# Patient Record
Sex: FEMALE | Race: WHITE | NOT HISPANIC OR LATINO | Employment: FULL TIME | URBAN - METROPOLITAN AREA
[De-identification: names, ages, dates, MRNs, and addresses within clinical notes are randomized per-mention and may not be internally consistent; named-entity substitution may affect disease eponyms.]

---

## 2017-02-08 ENCOUNTER — APPOINTMENT (OUTPATIENT)
Dept: PREADMISSION TESTING | Facility: HOSPITAL | Age: 67
End: 2017-02-08

## 2017-02-08 ENCOUNTER — HOSPITAL ENCOUNTER (OUTPATIENT)
Dept: GENERAL RADIOLOGY | Facility: HOSPITAL | Age: 67
Discharge: HOME | End: 2017-02-08
Admitting: COLON & RECTAL SURGERY

## 2017-02-08 VITALS — BODY MASS INDEX: 24.8 KG/M2 | HEIGHT: 63 IN | WEIGHT: 139.99 LBS

## 2017-02-08 LAB
ANION GAP SERPL CALCULATED.3IONS-SCNC: 8 MMOL/L (ref 3–11)
APTT PPP: <24 SECONDS (ref 24–31)
BUN BLD-MCNC: 9 MG/DL (ref 9–23)
BUN/CREAT SERPL: 11.3 (ref 7–25)
CALCIUM SPEC-SCNC: 10.6 MG/DL (ref 8.7–10.4)
CHLORIDE SERPL-SCNC: 105 MMOL/L (ref 99–109)
CO2 SERPL-SCNC: 28 MMOL/L (ref 20–31)
CREAT BLD-MCNC: 0.8 MG/DL (ref 0.6–1.3)
DEPRECATED RDW RBC AUTO: 57.4 FL (ref 37–54)
ERYTHROCYTE [DISTWIDTH] IN BLOOD BY AUTOMATED COUNT: 19 % (ref 11.3–14.5)
GFR SERPL CREATININE-BSD FRML MDRD: 72 ML/MIN/1.73
GLUCOSE BLD-MCNC: 138 MG/DL (ref 70–100)
HBA1C MFR BLD: 6.4 % (ref 4.8–5.6)
HCT VFR BLD AUTO: 38.4 % (ref 34.5–44)
HGB BLD-MCNC: 12.1 G/DL (ref 11.5–15.5)
INR PPP: 1
MCH RBC QN AUTO: 25.7 PG (ref 27–31)
MCHC RBC AUTO-ENTMCNC: 31.5 G/DL (ref 32–36)
MCV RBC AUTO: 81.5 FL (ref 80–99)
PLATELET # BLD AUTO: 385 10*3/MM3 (ref 150–450)
PMV BLD AUTO: 9.6 FL (ref 6–12)
POTASSIUM BLD-SCNC: 4 MMOL/L (ref 3.5–5.5)
PROTHROMBIN TIME: 10.9 SECONDS (ref 9.6–11.5)
RBC # BLD AUTO: 4.71 10*6/MM3 (ref 3.89–5.14)
SODIUM BLD-SCNC: 141 MMOL/L (ref 132–146)
WBC NRBC COR # BLD: 9.27 10*3/MM3 (ref 3.5–10.8)

## 2017-02-08 PROCEDURE — 71020 HC CHEST PA AND LATERAL: CPT

## 2017-02-08 PROCEDURE — 36415 COLL VENOUS BLD VENIPUNCTURE: CPT

## 2017-02-08 PROCEDURE — 85730 THROMBOPLASTIN TIME PARTIAL: CPT | Performed by: COLON & RECTAL SURGERY

## 2017-02-08 PROCEDURE — 85610 PROTHROMBIN TIME: CPT | Performed by: COLON & RECTAL SURGERY

## 2017-02-08 PROCEDURE — 83036 HEMOGLOBIN GLYCOSYLATED A1C: CPT | Performed by: COLON & RECTAL SURGERY

## 2017-02-08 PROCEDURE — 80048 BASIC METABOLIC PNL TOTAL CA: CPT | Performed by: COLON & RECTAL SURGERY

## 2017-02-08 PROCEDURE — 85027 COMPLETE CBC AUTOMATED: CPT | Performed by: COLON & RECTAL SURGERY

## 2017-02-08 RX ORDER — LEVOCETIRIZINE DIHYDROCHLORIDE 5 MG/1
5 TABLET, FILM COATED ORAL EVERY EVENING
COMMUNITY

## 2017-02-08 RX ORDER — CELECOXIB 100 MG/1
100 CAPSULE ORAL DAILY
COMMUNITY

## 2017-02-08 NOTE — PAT
LUIS MEASUREMENTS   LENGTH 15IN  CALF 11.5IN     TYPE AND SCREEN TOO EARLY TO DRAW IN PAT, PINK TUBE DRAWN AND SENT TO LAB    MICKI IN SURGERY SCHEDULING ARRANGED HOTEL ROOM FOR PT ON 2-13-17 FOR NIGHT BEFORE SURGERY    ERAS INSTRUCTIONS AND EDUCATION GIVEN PT IN [facility]     LEROY SÁNCHEZ, GI NAVIGATOR NOTIFIED OF PT UPCOMING SURGERY ON 2-14-17

## 2017-02-14 ENCOUNTER — HOSPITAL ENCOUNTER (INPATIENT)
Facility: HOSPITAL | Age: 67
LOS: 2 days | Discharge: HOME | End: 2017-02-16
Attending: COLON & RECTAL SURGERY | Admitting: COLON & RECTAL SURGERY

## 2017-02-14 ENCOUNTER — ANESTHESIA (OUTPATIENT)
Dept: PERIOP | Facility: HOSPITAL | Age: 67
End: 2017-02-14

## 2017-02-14 ENCOUNTER — ANESTHESIA EVENT (OUTPATIENT)
Dept: PERIOP | Facility: HOSPITAL | Age: 67
End: 2017-02-14

## 2017-02-14 DIAGNOSIS — Z93.2 ILEOSTOMY, HAS CURRENTLY (HCC): ICD-10-CM

## 2017-02-14 PROBLEM — E78.5 HYPERLIPIDEMIA: Status: ACTIVE | Noted: 2017-02-14

## 2017-02-14 PROBLEM — R73.03 PREDIABETES: Status: ACTIVE | Noted: 2017-02-14

## 2017-02-14 LAB
ABO GROUP BLD: NORMAL
BLD GP AB SCN SERPL QL: NEGATIVE
GLUCOSE BLDC GLUCOMTR-MCNC: 116 MG/DL (ref 70–130)
GLUCOSE BLDC GLUCOMTR-MCNC: 151 MG/DL (ref 70–130)
GLUCOSE BLDC GLUCOMTR-MCNC: 165 MG/DL (ref 70–130)
GLUCOSE BLDC GLUCOMTR-MCNC: 92 MG/DL (ref 70–130)
HCT VFR BLD AUTO: 37.7 % (ref 34.5–44)
HGB BLD-MCNC: 11.9 G/DL (ref 11.5–15.5)
RH BLD: POSITIVE

## 2017-02-14 PROCEDURE — 25010000002 ERTAPENEM PER 500 MG: Performed by: COLON & RECTAL SURGERY

## 2017-02-14 PROCEDURE — 25010000002 DIAZEPAM PER 5 MG: Performed by: COLON & RECTAL SURGERY

## 2017-02-14 PROCEDURE — 82962 GLUCOSE BLOOD TEST: CPT

## 2017-02-14 PROCEDURE — 0DBB0ZZ EXCISION OF ILEUM, OPEN APPROACH: ICD-10-PCS | Performed by: COLON & RECTAL SURGERY

## 2017-02-14 PROCEDURE — 85014 HEMATOCRIT: CPT | Performed by: COLON & RECTAL SURGERY

## 2017-02-14 PROCEDURE — 85018 HEMOGLOBIN: CPT | Performed by: COLON & RECTAL SURGERY

## 2017-02-14 PROCEDURE — 25010000002 FENTANYL CITRATE (PF) 100 MCG/2ML SOLUTION: Performed by: NURSE ANESTHETIST, CERTIFIED REGISTERED

## 2017-02-14 PROCEDURE — 86901 BLOOD TYPING SEROLOGIC RH(D): CPT

## 2017-02-14 PROCEDURE — 88304 TISSUE EXAM BY PATHOLOGIST: CPT | Performed by: COLON & RECTAL SURGERY

## 2017-02-14 PROCEDURE — 86850 RBC ANTIBODY SCREEN: CPT

## 2017-02-14 PROCEDURE — 86900 BLOOD TYPING SEROLOGIC ABO: CPT

## 2017-02-14 PROCEDURE — 25010000002 NEOSTIGMINE 10 MG/10ML SOLUTION: Performed by: NURSE ANESTHETIST, CERTIFIED REGISTERED

## 2017-02-14 PROCEDURE — 25010000002 HYDROMORPHONE PER 4 MG: Performed by: COLON & RECTAL SURGERY

## 2017-02-14 PROCEDURE — 25010000002 HYDROMORPHONE PER 4 MG: Performed by: NURSE ANESTHETIST, CERTIFIED REGISTERED

## 2017-02-14 PROCEDURE — 25010000002 HEPARIN (PORCINE) PER 1000 UNITS: Performed by: COLON & RECTAL SURGERY

## 2017-02-14 PROCEDURE — 25010000002 PROPOFOL 10 MG/ML EMULSION: Performed by: NURSE ANESTHETIST, CERTIFIED REGISTERED

## 2017-02-14 PROCEDURE — 25010000002 DEXAMETHASONE PER 1 MG: Performed by: NURSE ANESTHETIST, CERTIFIED REGISTERED

## 2017-02-14 PROCEDURE — 94799 UNLISTED PULMONARY SVC/PX: CPT

## 2017-02-14 RX ORDER — CETIRIZINE HYDROCHLORIDE 10 MG/1
10 TABLET ORAL DAILY
Status: DISCONTINUED | OUTPATIENT
Start: 2017-02-14 | End: 2017-02-16 | Stop reason: HOSPADM

## 2017-02-14 RX ORDER — HYDRALAZINE HYDROCHLORIDE 20 MG/ML
10 INJECTION INTRAMUSCULAR; INTRAVENOUS EVERY 6 HOURS PRN
Status: DISCONTINUED | OUTPATIENT
Start: 2017-02-14 | End: 2017-02-16 | Stop reason: HOSPADM

## 2017-02-14 RX ORDER — ROCURONIUM BROMIDE 10 MG/ML
INJECTION, SOLUTION INTRAVENOUS AS NEEDED
Status: DISCONTINUED | OUTPATIENT
Start: 2017-02-14 | End: 2017-02-14 | Stop reason: SURG

## 2017-02-14 RX ORDER — ACETAMINOPHEN 10 MG/ML
1000 INJECTION, SOLUTION INTRAVENOUS EVERY 6 HOURS
Status: COMPLETED | OUTPATIENT
Start: 2017-02-14 | End: 2017-02-15

## 2017-02-14 RX ORDER — SODIUM CHLORIDE 0.9 % (FLUSH) 0.9 %
1-10 SYRINGE (ML) INJECTION AS NEEDED
Status: DISCONTINUED | OUTPATIENT
Start: 2017-02-14 | End: 2017-02-14 | Stop reason: HOSPADM

## 2017-02-14 RX ORDER — NALOXONE HCL 0.4 MG/ML
0.4 VIAL (ML) INJECTION
Status: DISCONTINUED | OUTPATIENT
Start: 2017-02-14 | End: 2017-02-15

## 2017-02-14 RX ORDER — HEPARIN SODIUM 5000 [USP'U]/ML
5000 INJECTION, SOLUTION INTRAVENOUS; SUBCUTANEOUS ONCE
Status: COMPLETED | OUTPATIENT
Start: 2017-02-14 | End: 2017-02-14

## 2017-02-14 RX ORDER — PROPOFOL 10 MG/ML
VIAL (ML) INTRAVENOUS AS NEEDED
Status: DISCONTINUED | OUTPATIENT
Start: 2017-02-14 | End: 2017-02-14 | Stop reason: SURG

## 2017-02-14 RX ORDER — NEOSTIGMINE METHYLSULFATE 1 MG/ML
INJECTION, SOLUTION INTRAVENOUS AS NEEDED
Status: DISCONTINUED | OUTPATIENT
Start: 2017-02-14 | End: 2017-02-14 | Stop reason: SURG

## 2017-02-14 RX ORDER — ONDANSETRON 2 MG/ML
4 INJECTION INTRAMUSCULAR; INTRAVENOUS EVERY 6 HOURS PRN
Status: DISCONTINUED | OUTPATIENT
Start: 2017-02-14 | End: 2017-02-16 | Stop reason: HOSPADM

## 2017-02-14 RX ORDER — SODIUM CHLORIDE, SODIUM LACTATE, POTASSIUM CHLORIDE, CALCIUM CHLORIDE 600; 310; 30; 20 MG/100ML; MG/100ML; MG/100ML; MG/100ML
9 INJECTION, SOLUTION INTRAVENOUS CONTINUOUS
Status: DISCONTINUED | OUTPATIENT
Start: 2017-02-14 | End: 2017-02-14 | Stop reason: HOSPADM

## 2017-02-14 RX ORDER — CELECOXIB 100 MG/1
100 CAPSULE ORAL DAILY
Status: DISCONTINUED | OUTPATIENT
Start: 2017-02-14 | End: 2017-02-16 | Stop reason: HOSPADM

## 2017-02-14 RX ORDER — MORPHINE SULFATE 2 MG/ML
1 INJECTION, SOLUTION INTRAMUSCULAR; INTRAVENOUS EVERY 4 HOURS PRN
Status: DISCONTINUED | OUTPATIENT
Start: 2017-02-14 | End: 2017-02-15

## 2017-02-14 RX ORDER — DIAZEPAM 5 MG/ML
2.5 INJECTION, SOLUTION INTRAMUSCULAR; INTRAVENOUS EVERY 6 HOURS PRN
Status: DISCONTINUED | OUTPATIENT
Start: 2017-02-14 | End: 2017-02-16 | Stop reason: HOSPADM

## 2017-02-14 RX ORDER — LIDOCAINE HYDROCHLORIDE 10 MG/ML
1 INJECTION, SOLUTION EPIDURAL; INFILTRATION; INTRACAUDAL; PERINEURAL ONCE
Status: COMPLETED | OUTPATIENT
Start: 2017-02-14 | End: 2017-02-14

## 2017-02-14 RX ORDER — ONDANSETRON 2 MG/ML
4 INJECTION INTRAMUSCULAR; INTRAVENOUS ONCE AS NEEDED
Status: DISCONTINUED | OUTPATIENT
Start: 2017-02-14 | End: 2017-02-14 | Stop reason: HOSPADM

## 2017-02-14 RX ORDER — LIDOCAINE HYDROCHLORIDE 20 MG/ML
INJECTION, SOLUTION INFILTRATION; PERINEURAL AS NEEDED
Status: DISCONTINUED | OUTPATIENT
Start: 2017-02-14 | End: 2017-02-14 | Stop reason: SURG

## 2017-02-14 RX ORDER — SODIUM CHLORIDE 9 MG/ML
INJECTION, SOLUTION INTRAVENOUS AS NEEDED
Status: DISCONTINUED | OUTPATIENT
Start: 2017-02-14 | End: 2017-02-14 | Stop reason: HOSPADM

## 2017-02-14 RX ORDER — DEXTROSE MONOHYDRATE, SODIUM CHLORIDE, SODIUM LACTATE, POTASSIUM CHLORIDE, CALCIUM CHLORIDE 5; 600; 310; 179; 20 G/100ML; MG/100ML; MG/100ML; MG/100ML; MG/100ML
125 INJECTION, SOLUTION INTRAVENOUS CONTINUOUS
Status: DISCONTINUED | OUTPATIENT
Start: 2017-02-14 | End: 2017-02-15

## 2017-02-14 RX ORDER — KETOROLAC TROMETHAMINE 30 MG/ML
15 INJECTION, SOLUTION INTRAMUSCULAR; INTRAVENOUS EVERY 6 HOURS PRN
Status: DISCONTINUED | OUTPATIENT
Start: 2017-02-14 | End: 2017-02-15

## 2017-02-14 RX ORDER — NICOTINE POLACRILEX 4 MG
15 LOZENGE BUCCAL
Status: DISCONTINUED | OUTPATIENT
Start: 2017-02-14 | End: 2017-02-16 | Stop reason: HOSPADM

## 2017-02-14 RX ORDER — FENTANYL CITRATE 50 UG/ML
INJECTION, SOLUTION INTRAMUSCULAR; INTRAVENOUS AS NEEDED
Status: DISCONTINUED | OUTPATIENT
Start: 2017-02-14 | End: 2017-02-14 | Stop reason: SURG

## 2017-02-14 RX ORDER — GLYCOPYRROLATE 0.2 MG/ML
INJECTION INTRAMUSCULAR; INTRAVENOUS AS NEEDED
Status: DISCONTINUED | OUTPATIENT
Start: 2017-02-14 | End: 2017-02-14 | Stop reason: SURG

## 2017-02-14 RX ORDER — HEPARIN SODIUM 5000 [USP'U]/ML
5000 INJECTION, SOLUTION INTRAVENOUS; SUBCUTANEOUS EVERY 8 HOURS SCHEDULED
Status: DISCONTINUED | OUTPATIENT
Start: 2017-02-15 | End: 2017-02-16 | Stop reason: HOSPADM

## 2017-02-14 RX ORDER — FENTANYL CITRATE 50 UG/ML
50 INJECTION, SOLUTION INTRAMUSCULAR; INTRAVENOUS
Status: DISCONTINUED | OUTPATIENT
Start: 2017-02-14 | End: 2017-02-14 | Stop reason: HOSPADM

## 2017-02-14 RX ORDER — PANTOPRAZOLE SODIUM 40 MG/1
40 TABLET, DELAYED RELEASE ORAL
Status: DISCONTINUED | OUTPATIENT
Start: 2017-02-14 | End: 2017-02-16 | Stop reason: HOSPADM

## 2017-02-14 RX ORDER — FAMOTIDINE 20 MG/1
20 TABLET, FILM COATED ORAL 2 TIMES DAILY
Status: DISCONTINUED | OUTPATIENT
Start: 2017-02-14 | End: 2017-02-16 | Stop reason: HOSPADM

## 2017-02-14 RX ORDER — HYDROMORPHONE HYDROCHLORIDE 1 MG/ML
0.5 INJECTION, SOLUTION INTRAMUSCULAR; INTRAVENOUS; SUBCUTANEOUS
Status: DISCONTINUED | OUTPATIENT
Start: 2017-02-14 | End: 2017-02-14 | Stop reason: HOSPADM

## 2017-02-14 RX ORDER — FAMOTIDINE 10 MG/ML
20 INJECTION, SOLUTION INTRAVENOUS ONCE
Status: DISCONTINUED | OUTPATIENT
Start: 2017-02-14 | End: 2017-02-14

## 2017-02-14 RX ORDER — DEXAMETHASONE SODIUM PHOSPHATE 10 MG/ML
INJECTION INTRAMUSCULAR; INTRAVENOUS AS NEEDED
Status: DISCONTINUED | OUTPATIENT
Start: 2017-02-14 | End: 2017-02-14 | Stop reason: SURG

## 2017-02-14 RX ORDER — DEXTROSE MONOHYDRATE 25 G/50ML
25 INJECTION, SOLUTION INTRAVENOUS
Status: DISCONTINUED | OUTPATIENT
Start: 2017-02-14 | End: 2017-02-16 | Stop reason: HOSPADM

## 2017-02-14 RX ORDER — PREGABALIN 75 MG/1
75 CAPSULE ORAL ONCE
Status: COMPLETED | OUTPATIENT
Start: 2017-02-14 | End: 2017-02-14

## 2017-02-14 RX ORDER — ALVIMOPAN 12 MG/1
12 CAPSULE ORAL 2 TIMES DAILY
Status: DISCONTINUED | OUTPATIENT
Start: 2017-02-15 | End: 2017-02-16 | Stop reason: HOSPADM

## 2017-02-14 RX ORDER — MAGNESIUM HYDROXIDE 1200 MG/15ML
LIQUID ORAL AS NEEDED
Status: DISCONTINUED | OUTPATIENT
Start: 2017-02-14 | End: 2017-02-14 | Stop reason: HOSPADM

## 2017-02-14 RX ORDER — MONTELUKAST SODIUM 10 MG/1
10 TABLET ORAL NIGHTLY
Status: DISCONTINUED | OUTPATIENT
Start: 2017-02-14 | End: 2017-02-16 | Stop reason: HOSPADM

## 2017-02-14 RX ORDER — ACETAMINOPHEN 10 MG/ML
1000 INJECTION, SOLUTION INTRAVENOUS ONCE
Status: COMPLETED | OUTPATIENT
Start: 2017-02-14 | End: 2017-02-14

## 2017-02-14 RX ORDER — SCOLOPAMINE TRANSDERMAL SYSTEM 1 MG/1
1 PATCH, EXTENDED RELEASE TRANSDERMAL ONCE
Status: DISCONTINUED | OUTPATIENT
Start: 2017-02-14 | End: 2017-02-14

## 2017-02-14 RX ORDER — FAMOTIDINE 20 MG/1
20 TABLET, FILM COATED ORAL ONCE
Status: COMPLETED | OUTPATIENT
Start: 2017-02-14 | End: 2017-02-14

## 2017-02-14 RX ORDER — ATORVASTATIN CALCIUM 10 MG/1
10 TABLET, FILM COATED ORAL NIGHTLY
Status: DISCONTINUED | OUTPATIENT
Start: 2017-02-14 | End: 2017-02-16 | Stop reason: HOSPADM

## 2017-02-14 RX ADMIN — FENTANYL CITRATE 50 MCG: 50 INJECTION, SOLUTION INTRAMUSCULAR; INTRAVENOUS at 10:36

## 2017-02-14 RX ADMIN — LIDOCAINE HYDROCHLORIDE 30 MG: 20 INJECTION, SOLUTION INFILTRATION; PERINEURAL at 10:09

## 2017-02-14 RX ADMIN — PANTOPRAZOLE SODIUM 40 MG: 40 TABLET, DELAYED RELEASE ORAL at 17:03

## 2017-02-14 RX ADMIN — HYDROMORPHONE HYDROCHLORIDE 0.5 MG: 1 INJECTION, SOLUTION INTRAMUSCULAR; INTRAVENOUS; SUBCUTANEOUS at 15:30

## 2017-02-14 RX ADMIN — SODIUM CHLORIDE, POTASSIUM CHLORIDE, SODIUM LACTATE AND CALCIUM CHLORIDE 9 ML/HR: 600; 310; 30; 20 INJECTION, SOLUTION INTRAVENOUS at 07:51

## 2017-02-14 RX ADMIN — PROPOFOL 10 MG: 10 INJECTION, EMULSION INTRAVENOUS at 10:25

## 2017-02-14 RX ADMIN — PROPOFOL 10 MG: 10 INJECTION, EMULSION INTRAVENOUS at 10:40

## 2017-02-14 RX ADMIN — DULOXETINE 60 MG: 60 CAPSULE, DELAYED RELEASE ORAL at 13:44

## 2017-02-14 RX ADMIN — HYDROMORPHONE HYDROCHLORIDE 0.5 MG: 1 INJECTION, SOLUTION INTRAMUSCULAR; INTRAVENOUS; SUBCUTANEOUS at 11:37

## 2017-02-14 RX ADMIN — SCOPALAMINE 1 PATCH: 1 PATCH, EXTENDED RELEASE TRANSDERMAL at 07:51

## 2017-02-14 RX ADMIN — POTASSIUM CHLORIDE, SODIUM CHLORIDE, CALCIUM CHLORIDE, SODIUM LACTATE, AND DEXTROSE MONOHYDRATE 125 ML/HR: 1.79; 6; .2; 3.1; 5 INJECTION, SOLUTION INTRAVENOUS at 13:45

## 2017-02-14 RX ADMIN — ROCURONIUM BROMIDE 30 MG: 10 INJECTION INTRAVENOUS at 10:10

## 2017-02-14 RX ADMIN — PREGABALIN 75 MG: 75 CAPSULE ORAL at 07:50

## 2017-02-14 RX ADMIN — PROPOFOL 10 MG: 10 INJECTION, EMULSION INTRAVENOUS at 10:30

## 2017-02-14 RX ADMIN — ATORVASTATIN CALCIUM 10 MG: 10 TABLET, FILM COATED ORAL at 21:13

## 2017-02-14 RX ADMIN — NEOSTIGMINE METHYLSULFATE 2 MG: 1 INJECTION, SOLUTION INTRAVENOUS at 10:52

## 2017-02-14 RX ADMIN — HYDROMORPHONE HYDROCHLORIDE 0.5 MG: 1 INJECTION, SOLUTION INTRAMUSCULAR; INTRAVENOUS; SUBCUTANEOUS at 19:29

## 2017-02-14 RX ADMIN — PROPOFOL 10 MG: 10 INJECTION, EMULSION INTRAVENOUS at 10:35

## 2017-02-14 RX ADMIN — FAMOTIDINE 20 MG: 20 TABLET ORAL at 17:03

## 2017-02-14 RX ADMIN — HEPARIN SODIUM 5000 UNITS: 5000 INJECTION, SOLUTION INTRAVENOUS; SUBCUTANEOUS at 07:51

## 2017-02-14 RX ADMIN — POTASSIUM CHLORIDE, SODIUM CHLORIDE, CALCIUM CHLORIDE, SODIUM LACTATE, AND DEXTROSE MONOHYDRATE 125 ML/HR: 1.79; 6; .2; 3.1; 5 INJECTION, SOLUTION INTRAVENOUS at 21:13

## 2017-02-14 RX ADMIN — DIAZEPAM 2.5 MG: 5 INJECTION, SOLUTION INTRAMUSCULAR; INTRAVENOUS at 11:57

## 2017-02-14 RX ADMIN — ACETAMINOPHEN 1000 MG: 10 INJECTION, SOLUTION INTRAVENOUS at 17:03

## 2017-02-14 RX ADMIN — ROBINUL 0.4 MG: 0.2 INJECTION INTRAMUSCULAR; INTRAVENOUS at 10:52

## 2017-02-14 RX ADMIN — FENTANYL CITRATE 50 MCG: 50 INJECTION, SOLUTION INTRAMUSCULAR; INTRAVENOUS at 10:30

## 2017-02-14 RX ADMIN — HYDROMORPHONE HYDROCHLORIDE 0.5 MG: 1 INJECTION, SOLUTION INTRAMUSCULAR; INTRAVENOUS; SUBCUTANEOUS at 11:42

## 2017-02-14 RX ADMIN — ACETAMINOPHEN 1000 MG: 10 INJECTION, SOLUTION INTRAVENOUS at 08:19

## 2017-02-14 RX ADMIN — DEXAMETHASONE SODIUM PHOSPHATE 4 MG: 10 INJECTION INTRAMUSCULAR; INTRAVENOUS at 10:22

## 2017-02-14 RX ADMIN — FAMOTIDINE 20 MG: 20 TABLET ORAL at 07:50

## 2017-02-14 RX ADMIN — PROPOFOL 10 MG: 10 INJECTION, EMULSION INTRAVENOUS at 10:45

## 2017-02-14 RX ADMIN — CETIRIZINE HYDROCHLORIDE 10 MG: 10 TABLET, FILM COATED ORAL at 13:44

## 2017-02-14 RX ADMIN — FENTANYL CITRATE 50 MCG: 50 INJECTION, SOLUTION INTRAMUSCULAR; INTRAVENOUS at 11:48

## 2017-02-14 RX ADMIN — SODIUM CHLORIDE 1 G: 9 INJECTION, SOLUTION INTRAVENOUS at 10:07

## 2017-02-14 RX ADMIN — PROPOFOL 150 MG: 10 INJECTION, EMULSION INTRAVENOUS at 10:10

## 2017-02-14 RX ADMIN — LIDOCAINE HYDROCHLORIDE 1 ML: 10 INJECTION, SOLUTION EPIDURAL; INFILTRATION; INTRACAUDAL; PERINEURAL at 07:51

## 2017-02-14 RX ADMIN — MONTELUKAST SODIUM 10 MG: 10 TABLET, FILM COATED ORAL at 21:13

## 2017-02-14 NOTE — BRIEF OP NOTE
ILEOSTOMY TAKEDOWN  Procedure Note    Rubia Ricardo  2/14/2017    Pre-op Diagnosis:   Ileostomy in place    Post-op Diagnosis:     Same  Procedure/CPT® Codes:      Procedure(s):  ILEOSTOMY TAKEDOWN    Surgeon(s):  Lexie Lua MD    Anesthesia: General with Block    Staff:   Circulator: Lexie Hernandez RN; Love Beaver RN  Scrub Person: Chon Lewis  Nursing Assistant: Holly Ram  Assistant: FAVIOLA Varma  Other: Shannon Cornell RN    Estimated Blood Loss: * No values recorded between 2/14/2017 10:07 AM and 2/14/2017 10:54 AM *    Specimens:                  ID Type Source Tests Collected by Time Destination   A : ILEOSTOMY TRIM Tissue Small Intestine, Ileum TISSUE EXAM Lexie Lua MD 2/14/2017 1042          Drains:   Ileostomy 10/25/16 0136 loop ileostomy (Active)           Findings: Minimal adhesions    Complications: None      Lexie Lua MD     Date: 2/14/2017  Time: 10:59 AM

## 2017-02-14 NOTE — PLAN OF CARE
Problem: Patient Care Overview (Adult)  Goal: Plan of Care Review  Outcome: Ongoing (interventions implemented as appropriate)    02/14/17 1636   Coping/Psychosocial Response Interventions   Plan Of Care Reviewed With patient   Patient Care Overview   Progress no change       Goal: Adult Individualization and Mutuality  Outcome: Ongoing (interventions implemented as appropriate)

## 2017-02-14 NOTE — H&P
Admission      Patient Name: Rubia Ricardo  MRN: [id number]  : 1950  DOS: 2017    Attending: Toy Olivas MD    Primary Care Provider: Skyler Ley DO      Patient Care Team:  Skyler Ley DO as PCP - General (Family Medicine)    Chief complaint:  Ileostomy takedown    Subjective   Patient is a 66 y.o. female presented for ileostomy takedown by Dr. Lua under GA. She tolerated surgery well and is admitted for further medical management. She had a colonoscopy in Oct 2016, had a perforated bowel and required emergency surgery for ostomy placement. Subsequently, she was readmitted for rule out sepsis and vomiting feces. She was followed by Infectious Disease for peritonitis. She spent several days at [facility] and then at a local rehab close to her home.    When seen post op she is doing well. Her pain is well controlled. She denies nausea, shortness of breath or chest pain. No hx of DVT or PE.    Seen later in her room, doing very well, no f/c/n/vom/sob. Ambulated to  already. wy  Records from her admit in 2016  reviewed.wy  Allergies:  Allergies   Allergen Reactions   • Sulfa Antibiotics Shortness Of Breath   • Erythromycin Rash       Meds:  Prescriptions Prior to Admission   Medication Sig Dispense Refill Last Dose   • Atorvastatin Calcium (LIPITOR PO) Take 1 tablet by mouth Daily.   2017 at 0800   • BIOTIN PO Take 1 tablet by mouth Daily.   2017 at 0800   • celecoxib (CeleBREX) 100 MG capsule Take 100 mg by mouth Daily.   2017 at 0800   • Cyanocobalamin (VITAMIN B12 PO) Take 1 tablet by mouth Daily.   2017 at 0800   • DULoxetine (CYMBALTA) 30 MG capsule Take 3 capsules by mouth Daily.   2017 at 0800   • famotidine (PEPCID) 20 MG tablet Take 1 tablet by mouth 2 (Two) Times a Day. (Patient taking differently: Take 20 mg by mouth 2 (Two) Times a Day As Needed.)   2017 at 0800   • Multiple Vitamins-Minerals (MULTIVITAMIN ADULT PO) Take 1 tablet  "by mouth Daily.   2/13/2017 at 0800   • ondansetron (ZOFRAN) 4 MG tablet Take 1 tablet by mouth Every 8 (Eight) Hours As Needed for nausea or vomiting. 30 tablet 1 Past Month   • pantoprazole (PROTONIX) 40 MG EC tablet Take 1 tablet by mouth 2 (Two) Times a Day Before Meals.   2/13/2017 at 0800   • levocetirizine (XYZAL) 5 MG tablet Take 5 mg by mouth Every Evening.   2/12/2017   • montelukast (SINGULAIR) 10 MG tablet Take 10 mg by mouth Every Night.   2/12/2017       History:   Past Medical History   Diagnosis Date   • Anxiety    • Arthritis    • Colitis    • Depression    • GERD (gastroesophageal reflux disease)    • Hyperlipidemia    • Hypertension      D/T PAIN AND STRESS, SINCE RESOLVED PER PT REPORT    • Wears glasses      Past Surgical History   Procedure Laterality Date   • Tubal abdominal ligation     • Cholecystectomy     • Colon resection N/A 10/25/2016     Procedure: COLON RESECTION LOW ANTERIOR, WITH DIVERTING ILEOSTOMY.;  Surgeon: Lexie Lua MD;  [facility];  Service:    • Colonoscopy  10/24/2016     BOWEL WAS KNICKED CAUSING PT TO HAVE A BOWEL RESECTION AND ILEOSTOMY      Family History   Problem Relation Age of Onset   • Parkinsonism Mother    • Stroke Father      Social History   Substance Use Topics   • Smoking status: Never Smoker   • Smokeless tobacco: Never Used   • Alcohol use No   Lives alone. 2 children. Work as  at [employer].    Review of Systems  Pertinent items are noted in HPI, all other systems reviewed and negative  Except some hair loss recently.wy    Vital Signs  Visit Vitals   • /74 (BP Location: Right arm)   • Pulse 78   • Temp 97.7 °F (36.5 °C) (Temporal Artery )   • Resp 16   • Ht 63\" (160 cm)   • Wt 139 lb 15.9 oz (63.5 kg)   • SpO2 94%   • BMI 24.8 kg/m2       Physical Exam:    General Appearance:    Alert, cooperative, in no acute distress   Head:    Normocephalic, without obvious abnormality, atraumatic   Eyes:            Lids and lashes " normal, conjunctivae and sclerae normal, no   icterus, no pallor, corneas clear, PERRLA   Ears:    Ears appear intact with no abnormalities noted   Throat:   No oral lesions, no thrush, oral mucosa moist   Neck:   No adenopathy, supple, trachea midline, no thyromegaly, no     carotid bruit, no JVD   Lungs:     Clear to auscultation,respirations regular, even and                   unlabored    Heart:    Regular rhythm and normal rate, normal S1 and S2, no            murmur, no gallop, no rub, no click   Abdomen:     Covaderm with shadow drainage. abd soft with expected tenderness.   Genitalia:    Deferred   Extremities:   Moves all extremities well, no edema, no cyanosis, no              redness   Pulses:   Pulses palpable and equal bilaterally   Skin:   No bleeding, bruising or rash   Neurologic:   Cranial nerves 2 - 12 grossly intact, sensation intact       Results from last 7 days  Lab Units 02/14/17  1257 02/08/17  1357   WBC 10*3/mm3  --  9.27   HEMOGLOBIN g/dL 11.9 12.1   HEMATOCRIT % 37.7 38.4   PLATELETS 10*3/mm3  --  385       Results from last 7 days  Lab Units 02/08/17  1357   SODIUM mmol/L 141   POTASSIUM mmol/L 4.0   CHLORIDE mmol/L 105   TOTAL CO2 mmol/L 28.0   BUN mg/dL 9   CREATININE mg/dL 0.80   CALCIUM mg/dL 10.6*   GLUCOSE mg/dL 138*     Lab Results   Component Value Date    HGBA1C 6.40 (H) 02/08/2017       Assessment and Plan:   Principal Problem:    S/P ileostomy takedown  Active Problems:    HTN (hypertension)    Anxiety and depression    GERD (gastroesophageal reflux disease)    Ileostomy in place    Hyperlipidemia    Prediabetes      Plan  1. Ambulation  2. Pain control-prns   3. IS-encourage  4. DVT proph- mechs/heparin   5. Bowel regimen- entereg  6. Resume home medications as appropriate  7. Monitor post-op labs, BG( Discussed with patient elevated A1C c/w prediabetes)  8. DC planning   9. Diet, advance as tolerated./IVF, watch UOP.  SSI PRN      FAVIOLA Henson  02/14/17  2:04  PM  Seen and examined by me. Agree with above. Discussed with patient.

## 2017-02-14 NOTE — ANESTHESIA PROCEDURE NOTES
Airway  Urgency: elective    Airway not difficult    General Information and Staff    Patient location during procedure: OR    Indications and Patient Condition  Indications for airway management: airway protection    Preoxygenated: yes  Mask difficulty assessment: 1 - vent by mask    Final Airway Details  Final airway type: endotracheal airway      Successful airway: ETT  Cuffed: yes   Successful intubation technique: direct laryngoscopy  Facilitating devices/methods: intubating stylet  Endotracheal tube insertion site: oral  Blade: Benitez  Blade size: #2  ETT size: 7.0 mm  Cormack-Lehane Classification: grade I - full view of glottis  Placement verified by: chest auscultation and capnometry   Measured from: lips  ETT to lips (cm): 20  Number of attempts at approach: 1

## 2017-02-14 NOTE — PLAN OF CARE
Problem: Perioperative Period (Adult)  Goal: Signs and Symptoms of Listed Potential Problems Will be Absent or Manageable (Perioperative Period)  Outcome: Ongoing (interventions implemented as appropriate)    02/14/17 3524   Perioperative Period   Problems Assessed (Perioperative Period) all   Problems Present (Perioperative Period) pain

## 2017-02-14 NOTE — OP NOTE
DATE OF PROCEDURE: 02/14/2017    SURGEON:  Lexie Lua MD    ASSISTANT:  FAVIOLA Cedillo     PRIMARY CARE PHYSICIAN:  Skyler Ley DO    PREOPERATIVE DIAGNOSIS: Ileostomy in place.     POSTOPERATIVE DIAGNOSIS:  Ileostomy in place.      PROCEDURE PERFORMED: Ileostomy takedown.     COMPLICATIONS: None.     ESTIMATED BLOOD LOSS: 20 mL     ANESTHESIA: General endotracheal anesthesia and 266 mg of Exparel local anesthetic solution.     INDICATION FOR PROCEDURE:  The patient is a 66-year-old female who has a history of low anterior resection with diverting loop ileostomy for a perforated colon a colonoscopy.  Recent proctoscopic exam and Gastrografin enema did not demonstrate any evidence of leak or complications from her anastomosis, as she has healed her incisions, and is overall physically fit for her operation. After risks, benefits, and alternatives were discussed, she decided to proceed with operative management.     DESCRIPTION OF PROCEDURE: The patient was brought to the operating room and placed in the supine position. After successful induction of general endotracheal anesthesia, the operative site was prepped and draped in the usual sterile fashion. The mucocutaneous junction was incised using a #15 blade scalpel. The bowel was dissected using a combination of sharp dissection and the electrocautery from down to the level of the fascia. Once it was all free up, mesentery was clamped and tied.  A side-to-side anastomosis was performed using a 75 blue ALEJA stapler. The anastomosis was inspected. There was no webbing or bleeding. The common enterotomy was closed using a single fire of ALEJA 75 blue stapler. The specimen was passed off the field. The apical and apical stitch was placed of 3-0 Vicryl. The anastomosis was placed back into the abdomen. The fascia was checked to be free of adhesions, which it was.  The fascia was then closed with #1 non-looped PDS figure-of-eight stitches. The wound was  irrigated and dried. The skin was closed with a loose pursestring of Monocryl. A dilute Betadine 4 x 4 was placed in the wound and Covaderm was applied.      All counts were announced as correct at the end the case.     The patient tolerated the procedure well, was extubated in the operating room, and transferred to recovery in stable condition.       Lexie Lua MD JR/srikanth  DD: 02/14/2017 11:01:47  DT: 02/14/2017 14:15:10  Voice Rec. ID #[id number]  Voice Original ID #[id number]  Doc ID #[id number]  Rev. #0  cc:    Skyler Ley DO

## 2017-02-14 NOTE — ANESTHESIA POSTPROCEDURE EVALUATION
Patient: Rubia Ricardo    Procedure Summary     Date Anesthesia Start Anesthesia Stop Room / Location    02/14/17 1007 1105  [facility] / [facility]       Procedure Diagnosis Surgeon Provider    ILEOSTOMY TAKEDOWN (N/A ) No diagnosis on file. MD Isidro Durbin MD          Anesthesia Type: general  Last vitals  BP      Temp      Pulse     Resp      SpO2        Post Anesthesia Care and Evaluation    Patient location during evaluation: PACU  Patient participation: complete - patient participated  Level of consciousness: awake  Pain score: 0  Pain management: adequate  Airway patency: patent  Anesthetic complications: No anesthetic complications  PONV Status: none  Cardiovascular status: hemodynamically stable and acceptable  Respiratory status: nonlabored ventilation, acceptable and nasal cannula  Hydration status: acceptable

## 2017-02-14 NOTE — ANESTHESIA PREPROCEDURE EVALUATION
Anesthesia Evaluation     Patient summary reviewed and Nursing notes reviewed      Airway   Mallampati: I  TM distance: >3 FB  Neck ROM: full  Dental      Pulmonary - negative pulmonary ROS   Cardiovascular     ECG reviewed    (+) hypertension,       Neuro/Psych  (+) psychiatric history,    GI/Hepatic/Renal/Endo    (+)  GERD, chronic renal disease,     Musculoskeletal (-) negative ROS    Abdominal    Substance History - negative use     OB/GYN negative ob/gyn ROS         Other                                  Anesthesia Plan    ASA 2     general   (TAP)  intravenous induction   Anesthetic plan and risks discussed with patient.    Plan discussed with CRNA.

## 2017-02-15 PROBLEM — D72.829 LEUKOCYTOSIS: Status: ACTIVE | Noted: 2017-02-15

## 2017-02-15 PROBLEM — D62 ACUTE BLOOD LOSS ANEMIA: Status: ACTIVE | Noted: 2017-02-15

## 2017-02-15 LAB
ANION GAP SERPL CALCULATED.3IONS-SCNC: 3 MMOL/L (ref 3–11)
BASOPHILS # BLD AUTO: 0.02 10*3/MM3 (ref 0–0.2)
BASOPHILS NFR BLD AUTO: 0.1 % (ref 0–1)
BUN BLD-MCNC: 17 MG/DL (ref 9–23)
BUN/CREAT SERPL: 21.3 (ref 7–25)
CALCIUM SPEC-SCNC: 9.4 MG/DL (ref 8.7–10.4)
CHLORIDE SERPL-SCNC: 107 MMOL/L (ref 99–109)
CO2 SERPL-SCNC: 28 MMOL/L (ref 20–31)
CREAT BLD-MCNC: 0.8 MG/DL (ref 0.6–1.3)
CYTO UR: NORMAL
DEPRECATED RDW RBC AUTO: 57.8 FL (ref 37–54)
EOSINOPHIL # BLD AUTO: 0 10*3/MM3 (ref 0.1–0.3)
EOSINOPHIL NFR BLD AUTO: 0 % (ref 0–3)
ERYTHROCYTE [DISTWIDTH] IN BLOOD BY AUTOMATED COUNT: 19.6 % (ref 11.3–14.5)
GFR SERPL CREATININE-BSD FRML MDRD: 72 ML/MIN/1.73
GLUCOSE BLD-MCNC: 110 MG/DL (ref 70–100)
GLUCOSE BLDC GLUCOMTR-MCNC: 111 MG/DL (ref 70–130)
GLUCOSE BLDC GLUCOMTR-MCNC: 119 MG/DL (ref 70–130)
GLUCOSE BLDC GLUCOMTR-MCNC: 125 MG/DL (ref 70–130)
GLUCOSE BLDC GLUCOMTR-MCNC: 137 MG/DL (ref 70–130)
HCT VFR BLD AUTO: 28.7 % (ref 34.5–44)
HGB BLD-MCNC: 8.9 G/DL (ref 11.5–15.5)
IMM GRANULOCYTES # BLD: 0.04 10*3/MM3 (ref 0–0.03)
IMM GRANULOCYTES NFR BLD: 0.3 % (ref 0–0.6)
LAB AP CASE REPORT: NORMAL
LAB AP CLINICAL INFORMATION: NORMAL
LYMPHOCYTES # BLD AUTO: 1.95 10*3/MM3 (ref 0.6–4.8)
LYMPHOCYTES NFR BLD AUTO: 13 % (ref 24–44)
Lab: NORMAL
MAGNESIUM SERPL-MCNC: 1.7 MG/DL (ref 1.3–2.7)
MCH RBC QN AUTO: 25.4 PG (ref 27–31)
MCHC RBC AUTO-ENTMCNC: 31 G/DL (ref 32–36)
MCV RBC AUTO: 81.8 FL (ref 80–99)
MONOCYTES # BLD AUTO: 0.97 10*3/MM3 (ref 0–1)
MONOCYTES NFR BLD AUTO: 6.5 % (ref 0–12)
NEUTROPHILS # BLD AUTO: 12.03 10*3/MM3 (ref 1.5–8.3)
NEUTROPHILS NFR BLD AUTO: 80.1 % (ref 41–71)
PATH REPORT.FINAL DX SPEC: NORMAL
PATH REPORT.GROSS SPEC: NORMAL
PLATELET # BLD AUTO: 317 10*3/MM3 (ref 150–450)
PMV BLD AUTO: 10.1 FL (ref 6–12)
POTASSIUM BLD-SCNC: 4.8 MMOL/L (ref 3.5–5.5)
RBC # BLD AUTO: 3.51 10*6/MM3 (ref 3.89–5.14)
SODIUM BLD-SCNC: 138 MMOL/L (ref 132–146)
WBC NRBC COR # BLD: 15.01 10*3/MM3 (ref 3.5–10.8)

## 2017-02-15 PROCEDURE — 25010000002 KETOROLAC TROMETHAMINE PER 15 MG: Performed by: COLON & RECTAL SURGERY

## 2017-02-15 PROCEDURE — G0009 ADMIN PNEUMOCOCCAL VACCINE: HCPCS | Performed by: INTERNAL MEDICINE

## 2017-02-15 PROCEDURE — 90732 PPSV23 VACC 2 YRS+ SUBQ/IM: CPT | Performed by: INTERNAL MEDICINE

## 2017-02-15 PROCEDURE — 25010000002 HEPARIN (PORCINE) PER 1000 UNITS: Performed by: COLON & RECTAL SURGERY

## 2017-02-15 PROCEDURE — G0108 DIAB MANAGE TRN  PER INDIV: HCPCS | Performed by: REGISTERED NURSE

## 2017-02-15 PROCEDURE — 25010000002 MORPHINE SULFATE (PF) 2 MG/ML SOLUTION: Performed by: COLON & RECTAL SURGERY

## 2017-02-15 PROCEDURE — 25010000004 PNEUMOCOCCAL VAC POLYVALENT PER 0.5 ML: Performed by: INTERNAL MEDICINE

## 2017-02-15 PROCEDURE — 85025 COMPLETE CBC W/AUTO DIFF WBC: CPT | Performed by: COLON & RECTAL SURGERY

## 2017-02-15 PROCEDURE — 83735 ASSAY OF MAGNESIUM: CPT | Performed by: COLON & RECTAL SURGERY

## 2017-02-15 PROCEDURE — 80048 BASIC METABOLIC PNL TOTAL CA: CPT | Performed by: COLON & RECTAL SURGERY

## 2017-02-15 PROCEDURE — 82962 GLUCOSE BLOOD TEST: CPT

## 2017-02-15 RX ORDER — IBUPROFEN 600 MG/1
600 TABLET ORAL
Status: DISCONTINUED | OUTPATIENT
Start: 2017-02-15 | End: 2017-02-16 | Stop reason: HOSPADM

## 2017-02-15 RX ORDER — OXYCODONE HYDROCHLORIDE 5 MG/1
5 TABLET ORAL EVERY 4 HOURS PRN
Status: DISCONTINUED | OUTPATIENT
Start: 2017-02-15 | End: 2017-02-16 | Stop reason: HOSPADM

## 2017-02-15 RX ORDER — ACETAMINOPHEN 325 MG/1
650 TABLET ORAL EVERY 6 HOURS
Status: DISCONTINUED | OUTPATIENT
Start: 2017-02-15 | End: 2017-02-16 | Stop reason: HOSPADM

## 2017-02-15 RX ADMIN — FAMOTIDINE 20 MG: 20 TABLET ORAL at 17:59

## 2017-02-15 RX ADMIN — PNEUMOCOCCAL VACCINE POLYVALENT 0.5 ML
25; 25; 25; 25; 25; 25; 25; 25; 25; 25; 25; 25; 25; 25; 25; 25; 25; 25; 25; 25; 25; 25; 25 INJECTION, SOLUTION INTRAMUSCULAR; SUBCUTANEOUS at 09:02

## 2017-02-15 RX ADMIN — KETOROLAC TROMETHAMINE 15 MG: 30 INJECTION, SOLUTION INTRAMUSCULAR at 15:56

## 2017-02-15 RX ADMIN — OXYCODONE HYDROCHLORIDE 5 MG: 5 TABLET ORAL at 20:41

## 2017-02-15 RX ADMIN — MONTELUKAST SODIUM 10 MG: 10 TABLET, FILM COATED ORAL at 20:35

## 2017-02-15 RX ADMIN — ACETAMINOPHEN 650 MG: 325 TABLET, FILM COATED ORAL at 17:59

## 2017-02-15 RX ADMIN — SODIUM CHLORIDE 500 ML: 9 INJECTION, SOLUTION INTRAVENOUS at 03:40

## 2017-02-15 RX ADMIN — HEPARIN SODIUM 5000 UNITS: 5000 INJECTION, SOLUTION INTRAVENOUS; SUBCUTANEOUS at 05:03

## 2017-02-15 RX ADMIN — CETIRIZINE HYDROCHLORIDE 10 MG: 10 TABLET, FILM COATED ORAL at 09:00

## 2017-02-15 RX ADMIN — IBUPROFEN 600 MG: 600 TABLET ORAL at 20:35

## 2017-02-15 RX ADMIN — ACETAMINOPHEN 1000 MG: 10 INJECTION, SOLUTION INTRAVENOUS at 05:03

## 2017-02-15 RX ADMIN — DULOXETINE 90 MG: 60 CAPSULE, DELAYED RELEASE ORAL at 09:00

## 2017-02-15 RX ADMIN — ALVIMOPAN 12 MG: 12 CAPSULE ORAL at 17:59

## 2017-02-15 RX ADMIN — HEPARIN SODIUM 5000 UNITS: 5000 INJECTION, SOLUTION INTRAVENOUS; SUBCUTANEOUS at 13:55

## 2017-02-15 RX ADMIN — KETOROLAC TROMETHAMINE 15 MG: 30 INJECTION, SOLUTION INTRAMUSCULAR at 09:01

## 2017-02-15 RX ADMIN — FAMOTIDINE 20 MG: 20 TABLET ORAL at 09:02

## 2017-02-15 RX ADMIN — MORPHINE SULFATE 1 MG: 2 INJECTION, SOLUTION INTRAMUSCULAR; INTRAVENOUS at 06:02

## 2017-02-15 RX ADMIN — HEPARIN SODIUM 5000 UNITS: 5000 INJECTION, SOLUTION INTRAVENOUS; SUBCUTANEOUS at 21:41

## 2017-02-15 RX ADMIN — ACETAMINOPHEN 650 MG: 325 TABLET, FILM COATED ORAL at 23:37

## 2017-02-15 RX ADMIN — ACETAMINOPHEN 1000 MG: 10 INJECTION, SOLUTION INTRAVENOUS at 00:06

## 2017-02-15 RX ADMIN — POTASSIUM CHLORIDE, SODIUM CHLORIDE, CALCIUM CHLORIDE, SODIUM LACTATE, AND DEXTROSE MONOHYDRATE 125 ML/HR: 1.79; 6; .2; 3.1; 5 INJECTION, SOLUTION INTRAVENOUS at 04:51

## 2017-02-15 RX ADMIN — MORPHINE SULFATE 1 MG: 2 INJECTION, SOLUTION INTRAMUSCULAR; INTRAVENOUS at 13:59

## 2017-02-15 RX ADMIN — PANTOPRAZOLE SODIUM 40 MG: 40 TABLET, DELAYED RELEASE ORAL at 17:59

## 2017-02-15 RX ADMIN — CELECOXIB 100 MG: 100 CAPSULE ORAL at 09:00

## 2017-02-15 RX ADMIN — ALVIMOPAN 12 MG: 12 CAPSULE ORAL at 09:00

## 2017-02-15 RX ADMIN — PANTOPRAZOLE SODIUM 40 MG: 40 TABLET, DELAYED RELEASE ORAL at 08:59

## 2017-02-15 RX ADMIN — ATORVASTATIN CALCIUM 10 MG: 10 TABLET, FILM COATED ORAL at 20:35

## 2017-02-15 NOTE — PROGRESS NOTES
Discharge Planning Assessment  [facility]     Patient Name: Rubia Ricardo  MRN: [id number]  Today's Date: 2/15/2017    Admit Date: 2/14/2017          Discharge Needs Assessment       02/15/17 1349    Living Environment    Lives With alone    Living Arrangements house    Provides Primary Care For no one    Quality Of Family Relationships supportive    Able to Return to Prior Living Arrangements yes    Living Arrangement Comments CM spoke with pt in room with permission in regards to discharge planning. Pt resides in [facility] in a house alone. Goal is to return home when medically ready for discharge. States has a daughter and son than can help her and provide transportation for her upon discharge. Pt is independent of ADLs.      Discharge Needs Assessment    Readmission Within The Last 30 Days no previous admission in last 30 days    Outpatient/Agency/Support Group Needs homecare agency (specify level of care)   [facility] for PT n the past, not currently.    Anticipated Changes Related to Illness other (see comments)   Pt denies discharge needs.     Equipment Currently Used at Home other (see comments)   Pt reports has rolling walker at home from a previous surgery, but doesnt use it.     Equipment Needed After Discharge other (see comments)   Has rolling walker at home but doesnt use it.     Transportation Available car    Discharge Disposition still a patient    Discharge Contact Information if Applicable Kimi Read(dtr): [phone] or Saturnino Ricardo(son): [phone]    Discharge Planning Comments Pt has [place] [employer] and denies recent changes in insurance. Pt states his prescriptions meds require a copay, but they are usually affordable. Pt uses [facility] in [place]. Pt denies discharge needs. Reports that she has family that will help her and provide transportation when medically ready for discharge. CM will cont to follow/assist for discharge needs.             Discharge Plan        02/15/17 1357    Case Management/Social Work Plan    Plan discharge plan    Patient/Family In Agreement With Plan yes    Additional Comments Goal is home when medically ready for discharge. Pt denies needs. States has family that can help her. CM will cont to follow.         Discharge Placement     No information found                Demographic Summary       02/15/17 1348    Primary Care Physician Information    Name Jil            Functional Status       02/15/17 1348    Functional Status Prior    Ambulation 0-->independent    Transferring 0-->independent    Toileting 0-->independent    Bathing 0-->independent    Dressing 0-->independent    Eating 0-->independent    Communication 0-->understands/communicates without difficulty    Swallowing 0-->swallows foods/liquids without difficulty            Psychosocial     None            Abuse/Neglect     None            Legal     None            Substance Abuse     None            Patient Forms     None          Chelsea Veliz RN

## 2017-02-15 NOTE — PLAN OF CARE
Problem: Patient Care Overview (Adult)  Goal: Plan of Care Review  Outcome: Ongoing (interventions implemented as appropriate)  Goal: Adult Individualization and Mutuality  Outcome: Ongoing (interventions implemented as appropriate)  Goal: Discharge Needs Assessment  Outcome: Ongoing (interventions implemented as appropriate)    Problem: Perioperative Period (Adult)  Goal: Signs and Symptoms of Listed Potential Problems Will be Absent or Manageable (Perioperative Period)  Outcome: Ongoing (interventions implemented as appropriate)    Problem: Pain, Acute (Adult)  Goal: Identify Related Risk Factors and Signs and Symptoms  Outcome: Ongoing (interventions implemented as appropriate)  Goal: Acceptable Pain Control/Comfort Level  Outcome: Ongoing (interventions implemented as appropriate)

## 2017-02-15 NOTE — CONSULTS
"Diabetes Education  Assessment/Teaching    Patient Name:  Rubia Ricardo  YOB: 1950  MRN: [id number]  Admit Date:  2/14/2017      Assessment Date:  2/15/2017       Most Recent Value    General Information      Referral From:  A1c, Blood glucose, MD order    Height  5' 3\" (1.6 m)    Weight  139 lb 15.9 oz (63.5 kg)    Pregnancy Assessment     Diabetes History     What type of diabetes do you have?  Pre-diabetes    Length of Diabetes Diagnosis  Newly diagnosed <6 months [stated had a grandmother and has a brother with diabetes, but no one had ever mentioned to her that her glucose was elevated before]    Current DM knowledge  fair    Have you had diabetes education/teaching in the past?  no    Do you test your blood sugar at home?  no    Education Preferences     Nutrition Information     Assessment Topics     DM Goals                Most Recent Value    DM Education Needs     Meter  Meter provided    Meter Type  One Touch    Frequency of Testing  Daily [urged to test daily and if glucose ac >180 2 test in a row contact PCP. Discussed need to control glucose for good wound healing]    Blood Glucose Target  -- [Influence of emotional and physical stress on glucose discussed]    Healthy Eating  Other (comment) [healthy plate provided. Discussed CDC recommendation for diabetes prevention of good wt and physical activity >150 min/wk]    Healthy Coping  Appropriate    Discharge Plan  Home    Motivation  Strong    Teaching Method  Explanation, Discussion, Demonstration, Handouts, Teach back    Patient Response  Verbalized understanding, Demonstrates adequately            Other Comments:  Patient educated on Pre-diabetes, diabetes and the disease process, types of DM, diagnosis/A1C, monitoring, signs and symptoms, activity and exercise and how to prevent disease from progressing. Changing behavior and goal setting relative to diet, exercise and healthy lifestyle was emphasized. Patient provided a new donated " Verio One Touch meter and taught how to use it. Pt. was also advised to contact PCP for prescription for lancets and strips. Pt. verbalized understanding and also completed a return demonstration on using the meter using Teach Back method. Discussed need for good glucose control for wound healing and urged to call PCP if ac glucose >180 on 2 test.  Pt advised to consult with PCP for further instructions, discuss A1C result, and POC. Education handouts provided, questions answered and pt. advised to call with any other questions or concerns.   Electronically signed by:  Di Paulino RN  02/15/17 12:33 PM

## 2017-02-15 NOTE — PROGRESS NOTES
"IM progress note      Rubia Ricardo  [id number]  1950     LOS: 1 day     Attending: Toy Olivas MD    Primary Care Provider: Skyler Ley DO      Chief Complaint/Reason for visit:  Ileostomy takedown    Subjective   Doing well. Pain control is good. No BM or flatus. Denies f/c/n/v/sob/cp.  Ambulating in the hallway. Doing very well. Good pain control. wy  Objective     Vital Signs  Blood pressure 108/59, pulse 69, temperature 98.7 °F (37.1 °C), temperature source Oral, resp. rate 18, height 63\" (160 cm), weight 139 lb 15.9 oz (63.5 kg), SpO2 95 %.  Temp (24hrs), Av.8 °F (36.6 °C), Min:97.6 °F (36.4 °C), Max:98.7 °F (37.1 °C)      Intake/Output:    Intake/Output Summary (Last 24 hours) at 02/15/17 1057  Last data filed at 02/15/17 0600   Gross per 24 hour   Intake   4659 ml   Output   1425 ml   Net   3234 ml       Nutrition: PO    Respiratory: RA    Physical Exam:     General Appearance:    Alert, cooperative, in no acute distress   Head:    Normocephalic, without obvious abnormality, atraumatic    Lungs:     Normal effort, symmetric chest rise, no crepitus, clear to      auscultation bilaterally                  Heart:    Regular rhythm and normal rate, normal S1 and S2, no            murmur, no gallop, no rub, no click   Abdomen:     Covaderm with shadow drainage. abd soft with expected tenderness. No RT, no rigidity.Minimal distention.wy   Extremities:   No clubbing, cyanosis or edema.  No deformities.    Pulses:   Pulses palpable and equal bilaterally   Skin:   No bleeding, bruising or rash   Neurologic:   Moves all extremities with no obvious focal motor deficit.  Cranial nerves 2 - 12 grossly intact     Results Review:     I reviewed the patient's new clinical results.     Results from last 7 days  Lab Units 02/15/17  0348 17  1257 17  1357   WBC 10*3/mm3 15.01*  --  9.27   HEMOGLOBIN g/dL 8.9* 11.9 12.1   HEMATOCRIT % 28.7* 37.7 38.4   PLATELETS 10*3/mm3 317  --  385 "       Results from last 7 days  Lab Units 02/15/17  0348 02/08/17  1357   SODIUM mmol/L 138 141   POTASSIUM mmol/L 4.8 4.0   CHLORIDE mmol/L 107 105   TOTAL CO2 mmol/L 28.0 28.0   BUN mg/dL 17 9   CREATININE mg/dL 0.80 0.80   CALCIUM mg/dL 9.4 10.6*   GLUCOSE mg/dL 110* 138*     Results for OZZY ROSALES (MRN [id number]) as of 2/15/2017 10:57   Ref. Range 2/14/2017 13:39 2/14/2017 17:10 2/14/2017 20:01 2/15/2017 03:48 2/15/2017 07:23   Glucose Latest Ref Range: 70 - 130 mg/dL 116 151 (H) 165 (H) 110 (H) 119       I reviewed the patient's new imaging including images and reports.    All medications reviewed.     alvimopan 12 mg Oral BID   atorvastatin 10 mg Oral Nightly   celecoxib 100 mg Oral Daily   cetirizine 10 mg Oral Daily   DULoxetine 90 mg Oral Daily   famotidine 20 mg Oral BID   heparin (porcine) 5,000 Units Subcutaneous Q8H   insulin lispro 2-7 Units Subcutaneous 4x Daily AC & at Bedtime   montelukast 10 mg Oral Nightly   pantoprazole 40 mg Oral BID AC         Assessment/Plan   Principal Problem:    S/P ileostomy takedown  Active Problems:    HTN (hypertension)    Anxiety and depression    GERD (gastroesophageal reflux disease)    Ileostomy in place    Hyperlipidemia    Prediabetes    Leukocytosis, likely reactive    Acute blood loss anemia, mild, asymptomatic      Plan  1. Ambulation encouraged  2. Pain control-prns   3. IS-encouraged  4. DVT proph- [facility]  5. Bowel regimen  6. Diet, soft diet as tolerated. IVF per surgery, decrease soon.wy  7. Monitor post-op labs, BG  8. DC planning  SSI PRN      FAVIOLA Henson  02/15/17  10:57 AM   Seen and examined by me. Agree with above. Discussed with patient.

## 2017-02-15 NOTE — PROGRESS NOTES
Overall looks good.  Abd benign    SLIV  ADAT  PO pain meds   Likely home tomorrow  Pt declines narcotics for discharge  F/u with me 2 weeks

## 2017-02-16 VITALS
WEIGHT: 139.99 LBS | DIASTOLIC BLOOD PRESSURE: 59 MMHG | BODY MASS INDEX: 24.8 KG/M2 | TEMPERATURE: 98.5 F | HEIGHT: 63 IN | SYSTOLIC BLOOD PRESSURE: 111 MMHG | OXYGEN SATURATION: 95 % | RESPIRATION RATE: 16 BRPM | HEART RATE: 88 BPM

## 2017-02-16 LAB
GLUCOSE BLDC GLUCOMTR-MCNC: 105 MG/DL (ref 70–130)
GLUCOSE BLDC GLUCOMTR-MCNC: 90 MG/DL (ref 70–130)

## 2017-02-16 PROCEDURE — 25010000002 HEPARIN (PORCINE) PER 1000 UNITS: Performed by: COLON & RECTAL SURGERY

## 2017-02-16 PROCEDURE — 82962 GLUCOSE BLOOD TEST: CPT

## 2017-02-16 RX ORDER — OXYCODONE HYDROCHLORIDE 5 MG/1
5 TABLET ORAL EVERY 4 HOURS PRN
Qty: 40 TABLET | Refills: 0 | Status: SHIPPED | OUTPATIENT
Start: 2017-02-16 | End: 2017-02-25

## 2017-02-16 RX ADMIN — IBUPROFEN 600 MG: 600 TABLET ORAL at 08:30

## 2017-02-16 RX ADMIN — PANTOPRAZOLE SODIUM 40 MG: 40 TABLET, DELAYED RELEASE ORAL at 08:30

## 2017-02-16 RX ADMIN — DULOXETINE 90 MG: 60 CAPSULE, DELAYED RELEASE ORAL at 08:33

## 2017-02-16 RX ADMIN — OXYCODONE HYDROCHLORIDE 5 MG: 5 TABLET ORAL at 05:26

## 2017-02-16 RX ADMIN — OXYCODONE HYDROCHLORIDE 5 MG: 5 TABLET ORAL at 15:23

## 2017-02-16 RX ADMIN — CELECOXIB 100 MG: 100 CAPSULE ORAL at 08:35

## 2017-02-16 RX ADMIN — FAMOTIDINE 20 MG: 20 TABLET ORAL at 08:34

## 2017-02-16 RX ADMIN — HEPARIN SODIUM 5000 UNITS: 5000 INJECTION, SOLUTION INTRAVENOUS; SUBCUTANEOUS at 14:59

## 2017-02-16 RX ADMIN — HEPARIN SODIUM 5000 UNITS: 5000 INJECTION, SOLUTION INTRAVENOUS; SUBCUTANEOUS at 05:26

## 2017-02-16 RX ADMIN — OXYCODONE HYDROCHLORIDE 5 MG: 5 TABLET ORAL at 10:22

## 2017-02-16 RX ADMIN — ACETAMINOPHEN 650 MG: 325 TABLET, FILM COATED ORAL at 12:05

## 2017-02-16 RX ADMIN — CETIRIZINE HYDROCHLORIDE 10 MG: 10 TABLET, FILM COATED ORAL at 08:39

## 2017-02-16 RX ADMIN — ACETAMINOPHEN 650 MG: 325 TABLET, FILM COATED ORAL at 05:25

## 2017-02-16 RX ADMIN — IBUPROFEN 600 MG: 600 TABLET ORAL at 12:05

## 2017-02-16 NOTE — PAYOR COMM NOTE
"Ozzy Ricardo (66 y.o. Female)     Date of Birth Social Security Number Address Home Phone MRN    1950  [address]  [facility] [id number]  [id number]    Sikhism Marital Status          Unknown        Admission Date Admission Type Admitting Provider Attending Provider Department, Room/Bed    17 Elective Lexie Lua MD [place], [place], MD [address], S[address]/2    Discharge Date Discharge Disposition Discharge Destination         Home or Self Care             Attending Provider: Toy Olivas MD     Allergies:  Sulfa Antibiotics, Erythromycin    Isolation:  None   Infection:  None   Code Status:  FULL    Ht:  63\" (160 cm)   Wt:  139 lb 15.9 oz (63.5 kg)    Admission Cmt:  None   Principal Problem:  S/P ileostomy takedown [Z93.2]                 Active Insurance as of 2017     Primary Coverage     Payor Plan Insurance Group Employer/Plan Group    [facility] [employer] EMPL[employer]EE [id number]     Payor Plan Address Payor Plan Phone Number Effective From Effective To    [address] [phone] 2015     [place] [address]       Subscriber Name Subscriber Birth Date Member ID       OZZY RICARDO 1950 [id number]                 Emergency Contacts      (Rel.) Home Phone Work Phone Mobile Phone    Kimi Read (Daughter) [phone] -- --    Saturnino Ricardo [phone] -- --               Discharge Summary      FAVIOLA Henson at 2017 11:54 AM          Patient Name: Ozzy Ricardo  MRN: [id number]  : 1950  DOS: 2017    Attending: Toy Olivas MD    Primary Care Provider: Skyler Ley DO    Date of Admission:.2017  7:27 AM    Date of Discharge:  2017    Discharge Diagnosis: Principal Problem:    S/P ileostomy takedown  Active Problems:    HTN (hypertension)    Anxiety and depression    GERD (gastroesophageal reflux disease)    Ileostomy in place    Hyperlipidemia    " Prediabetes    Leukocytosis, likely reactive    Acute blood loss anemia, mild, asymptomatic      Hospital Course  Patient is a 66 y.o. female presented for ileostomy takedown by Dr. Lua under [place]. She tolerated surgery well and was admitted for further medical management.     She had a colonoscopy in Oct 2016, had a perforated bowel and required emergency surgery for ostomy placement. Subsequently, she was readmitted for rule out sepsis and vomiting feces. She was followed by Infectious Disease for peritonitis. She spent several days at [facility] and then at a local rehab close to her home.    She was provided pain medication as needed for pain control.  She received DVT prophylaxis with subcutaneous Heparin as well as mechanicals    She used an IS for atelectasis prophylaxis.  During her stay, she passed flatus and tolerated her by mouth diet well.  She was provided a bowel regimen and was encouraged to ambulate frequently which she did.  Home medications were resumed as appropriate, and labs were monitored and remained fairly stable.   With the progress she has made, she is ready for DC home today.    Discussed with patient regarding plan and she shows understanding and agreement.       Procedures Performed  DATE OF PROCEDURE: 02/14/2017     SURGEON: Lexie Lua MD     ASSISTANT: FAVIOLA Cedillo      PRIMARY CARE PHYSICIAN: Skyler Ley DO     PREOPERATIVE DIAGNOSIS: Ileostomy in place.      POSTOPERATIVE DIAGNOSIS: Ileostomy in place.       PROCEDURE PERFORMED: Ileostomy takedown.        Pertinent Test Results:    I reviewed the patient's new clinical results.     Results from last 7 days  Lab Units 02/15/17  0348 02/14/17  1257   WBC 10*3/mm3 15.01*  --    HEMOGLOBIN g/dL 8.9* 11.9   HEMATOCRIT % 28.7* 37.7   PLATELETS 10*3/mm3 317  --        Results from last 7 days  Lab Units 02/15/17  0348   SODIUM mmol/L 138   POTASSIUM mmol/L 4.8   CHLORIDE mmol/L 107   TOTAL CO2 mmol/L 28.0   BUN mg/dL 17  "  CREATININE mg/dL 0.80   CALCIUM mg/dL 9.4   GLUCOSE mg/dL 110*     I reviewed the patient's new imaging including images and reports.      Discharge Assessment:    Vital Signs  Visit Vitals   • /59   • Pulse 88   • Temp 98.5 °F (36.9 °C) (Temporal Artery )   • Resp 16   • Ht 63\" (160 cm)   • Wt 139 lb 15.9 oz (63.5 kg)   • SpO2 95%   • BMI 24.8 kg/m2     Temp (24hrs), Av °F (37.2 °C), Min:98.5 °F (36.9 °C), Max:99.4 °F (37.4 °C)      General Appearance:    Alert, cooperative, in no acute distress   Lungs:     Clear to auscultation,respirations regular, even and                   unlabored    Heart:    Regular rhythm and normal rate, normal S1 and S2, no            murmur, no gallop, no rub, no click   Abdomen:     Covaderm CDI. abd soft with expected tenderness. No RT, no rigidity.Minimal distention   Extremities:   Moves all extremities well, no edema, no cyanosis, no              redness   Pulses:   Pulses palpable and equal bilaterally   Skin:   No bleeding, bruising or rash   Neurologic:   Cranial nerves 2 - 12 grossly intact, sensation intact       Discharge Disposition: Home    Discharge Medications   Rubia Ricardo   Home Medication Instructions JACQUELINE:[id number]    Printed on:17 5974   Medication Information                      Atorvastatin Calcium (LIPITOR PO)  Take 1 tablet by mouth Daily.             BIOTIN PO  Take 1 tablet by mouth Daily.             celecoxib (CeleBREX) 100 MG capsule  Take 100 mg by mouth Daily.             Cyanocobalamin (VITAMIN B12 PO)  Take 1 tablet by mouth Daily.             DULoxetine (CYMBALTA) 30 MG capsule  Take 3 capsules by mouth Daily.             famotidine (PEPCID) 20 MG tablet  Take 1 tablet by mouth 2 (Two) Times a Day.             levocetirizine (XYZAL) 5 MG tablet  Take 5 mg by mouth Every Evening.             montelukast (SINGULAIR) 10 MG tablet  Take 10 mg by mouth Every Night.             Multiple Vitamins-Minerals (MULTIVITAMIN ADULT PO)  Take 1 " tablet by mouth Daily.             ondansetron (ZOFRAN) 4 MG tablet  Take 1 tablet by mouth Every 8 (Eight) Hours As Needed for nausea or vomiting.             oxyCODONE (ROXICODONE) 5 MG immediate release tablet  Take 1 tablet by mouth Every 4 (Four) Hours As Needed for moderate pain (4-6) for up to 9 days.             pantoprazole (PROTONIX) 40 MG EC tablet  Take 1 tablet by mouth 2 (Two) Times a Day Before Meals.                 Discharge Diet: soft, bland diet    Activity at Discharge: ambulate    Follow-up Appointments  Dr. Lua per her orders       FAVIOLA Henson  02/16/17  11:54 AM             Electronically signed by FAVIOLA Henson at 2/16/2017 11:58 AM        Discharge Order     Start     Ordered    02/16/17 1154  Discharge patient  Once     Expected Discharge Date:  02/16/17    Discharge Disposition:  Home or Self Care        02/16/17 1151

## 2017-02-16 NOTE — DISCHARGE SUMMARY
Patient Name: Rubia Ricardo  MRN: [id number]  : 1950  DOS: 2017    Attending: Toy Olivas MD    Primary Care Provider: Skyler Ley DO    Date of Admission:.2017  7:27 AM    Date of Discharge:  2017    Discharge Diagnosis: Principal Problem:    S/P ileostomy takedown  Active Problems:    HTN (hypertension)    Anxiety and depression    GERD (gastroesophageal reflux disease)    Ileostomy in place    Hyperlipidemia    Prediabetes    Leukocytosis, likely reactive    Acute blood loss anemia, mild, asymptomatic      Hospital Course  Patient is a 66 y.o. female presented for ileostomy takedown by Dr. Lua under [place].   She tolerated surgery well and was admitted for further medical management.     ((She had a colonoscopy in Oct 2016, had a perforated bowel and required emergency surgery for ostomy placement. Subsequently, she was readmitted for rule out sepsis and vomiting feces. She was followed by Infectious Disease for peritonitis. She spent several days at [facility] and then at a local rehab close to her home.))    Following her admit, she was provided pain medication as needed for pain control.  She received DVT prophylaxis with subcutaneous Heparin as well as mechanicals      She used an IS for atelectasis prophylaxis.  During her stay, she passed flatus and bowel movement,  and tolerated her by mouth diet well.  She was provided a bowel regimen and was encouraged to ambulate frequently which she did.    Home medications were resumed as appropriate, and labs were monitored and remained fairly stable.   With the progress she has made, she is ready for DC home today.    Discussed with patient regarding plan and she shows understanding and agreement.       Procedures Performed  DATE OF PROCEDURE: 2017     SURGEON: Lexie Lua MD     ASSISTANT: FAVIOLA Cedillo      PRIMARY CARE PHYSICIAN: Skyler Ley DO     PREOPERATIVE DIAGNOSIS: Ileostomy in place.      POSTOPERATIVE  "DIAGNOSIS: Ileostomy in place.       PROCEDURE PERFORMED: Ileostomy takedown.        Pertinent Test Results:    I reviewed the patient's new clinical results.     Results from last 7 days  Lab Units 02/15/17  0348 17  1257   WBC 10*3/mm3 15.01*  --    HEMOGLOBIN g/dL 8.9* 11.9   HEMATOCRIT % 28.7* 37.7   PLATELETS 10*3/mm3 317  --        Results from last 7 days  Lab Units 02/15/17  0348   SODIUM mmol/L 138   POTASSIUM mmol/L 4.8   CHLORIDE mmol/L 107   TOTAL CO2 mmol/L 28.0   BUN mg/dL 17   CREATININE mg/dL 0.80   CALCIUM mg/dL 9.4   GLUCOSE mg/dL 110*     I reviewed the patient's new imaging including images and reports.      Discharge Assessment:    Vital Signs  Visit Vitals   • /59   • Pulse 88   • Temp 98.5 °F (36.9 °C) (Temporal Artery )   • Resp 16   • Ht 63\" (160 cm)   • Wt 139 lb 15.9 oz (63.5 kg)   • SpO2 95%   • BMI 24.8 kg/m2     Temp (24hrs), Av °F (37.2 °C), Min:98.5 °F (36.9 °C), Max:99.4 °F (37.4 °C)      General Appearance:    Alert, cooperative, in no acute distress   Lungs:     Clear to auscultation,respirations regular, even and                   unlabored    Heart:    Regular rhythm and normal rate, normal S1 and S2, no            murmur, no gallop, no rub, no click   Abdomen:     Covaderm CDI. abd soft with expected tenderness. No RT, no rigidity.Minimal distention   Extremities:   Moves all extremities well, no edema, no cyanosis, no              redness   Pulses:   Pulses palpable and equal bilaterally   Skin:   No bleeding, bruising or rash   Neurologic:   Cranial nerves 2 - 12 grossly intact, sensation intact       Discharge Disposition: Home    Discharge Medications   Rubia Ricardo   Home Medication Instructions JACQUELINE:[id number]    Printed on:17 1158   Medication Information                      Atorvastatin Calcium (LIPITOR PO)  Take 1 tablet by mouth Daily.             BIOTIN PO  Take 1 tablet by mouth Daily.             celecoxib (CeleBREX) 100 MG capsule  Take 100 " mg by mouth Daily.             Cyanocobalamin (VITAMIN B12 PO)  Take 1 tablet by mouth Daily.             DULoxetine (CYMBALTA) 30 MG capsule  Take 3 capsules by mouth Daily.             famotidine (PEPCID) 20 MG tablet  Take 1 tablet by mouth 2 (Two) Times a Day.             levocetirizine (XYZAL) 5 MG tablet  Take 5 mg by mouth Every Evening.             montelukast (SINGULAIR) 10 MG tablet  Take 10 mg by mouth Every Night.             Multiple Vitamins-Minerals (MULTIVITAMIN ADULT PO)  Take 1 tablet by mouth Daily.             ondansetron (ZOFRAN) 4 MG tablet  Take 1 tablet by mouth Every 8 (Eight) Hours As Needed for nausea or vomiting.             oxyCODONE (ROXICODONE) 5 MG immediate release tablet  Take 1 tablet by mouth Every 4 (Four) Hours As Needed for moderate pain (4-6) for up to 9 days.             pantoprazole (PROTONIX) 40 MG EC tablet  Take 1 tablet by mouth 2 (Two) Times a Day Before Meals.                 Discharge Diet: soft, bland diet    Activity at Discharge: ambulate    Follow-up Appointments  Dr. Lua per her orders       FAVIOLA Henson  02/16/17  11:54 AM    Seen and examined by me. Agree with above. Discussed with patient and RN.  Discharge took over 30 min

## 2017-02-16 NOTE — PROGRESS NOTES
"Adult Nutrition  Assessment/PES    Patient Name:  Rubia Ricardo  YOB: 1950  MRN: [id number]  Admit Date:  2/14/2017    Assessment Date:  2/15/2017        Reason for Assessment       02/15/17 1904    Reason for Assessment    Reason For Assessment/Visit identified at risk by screening criteria    Identified At Risk By Screening Criteria new diagnosis of diabetes or cancer   pre dm    Time Spent (min) 30    Cardiac HTN;Dyslipidemia    Endocrine Pre-diabetes    Gastrointestinal Other (comment)   ileostomy take down hx sbo, divertic, perf colon    Hematological Acute blood loss    Ortho Osteoarthritis              Nutrition/Diet History       02/15/17 1907    Nutrition/Diet History    Reported/Observed By Patient    Other doing fine w reg diet; aware blood sugar            Anthropometrics       02/15/17 1907    Anthropometrics    Height 160 cm (63\")    Weight 63.5 kg (139 lb 15.9 oz)    Ideal Body Weight (IBW)    Ideal Body Weight (IBW), Female 53.12    % Ideal Body Weight 119.8    Body Mass Index (BMI)    BMI (kg/m2) 24.85            Labs/Tests/Procedures/Meds       02/15/17 1907    Labs/Tests/Procedures/Meds    Labs/Tests Review Reviewed                Nutrition Prescription Ordered       02/15/17 1908    Nutrition Prescription PO    Current PO Diet Regular            Evaluation of Received Nutrient/Fluid Intake       02/15/17 1908    PO Evaluation    Number of Days PO Intake Evaluated Insufficient Data              Problem/Interventions:        Problem 1       02/15/17 1908    Nutrition Diagnoses Problem 1    Problem 1 Knowledge Deficit   potential    Endocrine Pre-diabetes    Signs/Symptoms (evidenced by) Potential Information Deficit    Resolved? Yes                    Intervention Goal       02/15/17 1908    Intervention Goal    General Nutrition support treatment;Provide information regarding MNT for treatment/condition            Nutrition Intervention       02/15/17 1909    Nutrition Intervention "    RD/Tech Action Advise alternate selection;Menu provided              Education/Evaluation       02/15/17 2896    Education    Education Education offered and refused;Provided education regarding;Education topics    Provided education regarding Diet rationale;Key food habit change    Education Topics PreDiabetes    Monitor/Evaluation    Monitor Per protocol    Education Follow-up Other (comment)   Full attn to materials anticipate will follow well.        Comments:      Electronically signed by:  Chey Conteh RD  02/15/17 7:10 PM

## 2017-02-17 NOTE — PLAN OF CARE
Problem: Patient Care Overview (Adult)  Goal: Plan of Care Review  Outcome: Outcome(s) achieved Date Met:  02/16/17  Goal: Adult Individualization and Mutuality  Outcome: Outcome(s) achieved Date Met:  02/16/17  Goal: Discharge Needs Assessment  Outcome: Outcome(s) achieved Date Met:  02/16/17    Problem: Perioperative Period (Adult)  Goal: Signs and Symptoms of Listed Potential Problems Will be Absent or Manageable (Perioperative Period)  Outcome: Outcome(s) achieved Date Met:  02/16/17    Problem: Pain, Acute (Adult)  Goal: Identify Related Risk Factors and Signs and Symptoms  Outcome: Outcome(s) achieved Date Met:  02/16/17  Goal: Acceptable Pain Control/Comfort Level  Outcome: Outcome(s) achieved Date Met:  02/16/17

## (undated) DEVICE — DRAPE,UNDERBUTTOCKS,STERILE: Brand: MEDLINE

## (undated) DEVICE — MEDI-VAC YANKAUER SUCTION HANDLE W/BULBOUS TIP: Brand: CARDINAL HEALTH

## (undated) DEVICE — ENCORE® LATEX MICRO SIZE 7, STERILE LATEX POWDER-FREE SURGICAL GLOVE: Brand: ENCORE

## (undated) DEVICE — DUAL LUMEN STOMACH TUBE,ANTI-REFLUX VALVE: Brand: SALEM SUMP

## (undated) DEVICE — INTENDED FOR TISSUE SEPARATION, AND OTHER PROCEDURES THAT REQUIRE A SHARP SURGICAL BLADE TO PUNCTURE OR CUT.: Brand: BARD-PARKER ® STAINLESS STEEL BLADES

## (undated) DEVICE — SUT VIC PLS CTD ANTIB 2/0 18IN VCP111G

## (undated) DEVICE — AIRWY 90MM NO9

## (undated) DEVICE — MEDI-VAC NON-CONDUCTIVE SUCTION TUBING: Brand: CARDINAL HEALTH

## (undated) DEVICE — ANTIBACTERIAL UNDYED BRAIDED (POLYGLACTIN 910), SYNTHETIC ABSORBABLE SUTURE: Brand: COATED VICRYL

## (undated) DEVICE — TRY SKINPREP DRYPREP

## (undated) DEVICE — Device

## (undated) DEVICE — CANNULA,ADULT,SOFT-TOUCH,7TUBE,SC: Brand: MEDLINE

## (undated) DEVICE — ENCORE® LATEX MICRO SIZE 6.5, STERILE LATEX POWDER-FREE SURGICAL GLOVE: Brand: ENCORE

## (undated) DEVICE — SUT VIC 12X27 D8116 BX/12

## (undated) DEVICE — LEX BASIC NO DRAPE: Brand: MEDLINE INDUSTRIES, INC.

## (undated) DEVICE — SUT PDS 1 CTX 36IN VIO PDP371T

## (undated) DEVICE — LEGGINGS, PAIR, 29X43, STERILE: Brand: MEDLINE